# Patient Record
Sex: FEMALE | URBAN - METROPOLITAN AREA
[De-identification: names, ages, dates, MRNs, and addresses within clinical notes are randomized per-mention and may not be internally consistent; named-entity substitution may affect disease eponyms.]

---

## 2018-10-18 ENCOUNTER — IMPORTED ENCOUNTER (OUTPATIENT)
Dept: URBAN - METROPOLITAN AREA CLINIC 1 | Facility: CLINIC | Age: 28
End: 2018-10-18

## 2018-10-18 PROBLEM — H52.13: Noted: 2018-10-18

## 2018-10-18 PROBLEM — H52.4: Noted: 2018-10-18

## 2018-10-18 PROCEDURE — S0620 ROUTINE OPHTHALMOLOGICAL EXA: HCPCS

## 2018-10-18 NOTE — PATIENT DISCUSSION
1. Myopia OU -- Finalized Glasses MRx was given to patient today for correction if indicated and requested2. Presbyopia OU 3. Dry Eyes w/ PEK OU (Plugs in Place LL's OU) -- Recommend the frequent use of OTC AT's BID-QID OU (sample given)4. S/p LASIK OU (Dr. Elizabeth Cuello - 4 yrs ago) -- Doing well Trial CTL given to patient today (Check BCVA w/ CTL: 20/15 & J1)Return for an appointment in 1 WK for a CC OU with Dr. Shanika Hansen.

## 2018-10-29 ENCOUNTER — IMPORTED ENCOUNTER (OUTPATIENT)
Dept: URBAN - METROPOLITAN AREA CLINIC 1 | Facility: CLINIC | Age: 28
End: 2018-10-29

## 2018-10-29 PROBLEM — H52.13: Noted: 2018-10-29

## 2018-10-29 NOTE — PATIENT DISCUSSION
1.  CC today- Final rx for CL given. Pt also has been experiencing JUVENTINO OU w/ CL Wear. Advised use ATs TID OU Routinely (can use more frequently PRN) Begin Omega 3 Fatty Acids. 2.  Myopia OU -- Finalized Glasses MRx was given to patient today for correction if indicated and requested3. Presbyopia OU 4. Dry Eyes w/ PEK OU (Plugs in Place LL's OU) -- Recommend the frequent use of OTC AT's BID-QID OU (sample given)5. S/p LASIK OU (Dr. Brain Esquivel - 4 yrs ago) -- Doing well F/u in 1 year for 40/cc. Return for an appointment in 6 mo 30 Tear Lab with Dr. Natasha Baltazar.

## 2019-11-15 ENCOUNTER — IMPORTED ENCOUNTER (OUTPATIENT)
Dept: URBAN - METROPOLITAN AREA CLINIC 1 | Facility: CLINIC | Age: 29
End: 2019-11-15

## 2019-11-15 PROBLEM — H52.4: Noted: 2019-11-15

## 2019-11-15 PROBLEM — H52.13: Noted: 2019-11-15

## 2019-11-15 PROCEDURE — S0621 ROUTINE OPHTHALMOLOGICAL EXA: HCPCS

## 2019-11-15 NOTE — PATIENT DISCUSSION
1. Myopia / Presbyopia OU -- Finalized Glasses MRx was given to patient today for correction if indicated and requested. 2. Dry Eyes w/ PEK OU (Plugs in Place RLL Puncta Open LLL) -- Recommend the frequent use of OTC AT's BID-QID OU Routinely (Sample of Blink Given). 3. S/p LASIK OU (Dr. Angle Pappas - 4 yrs ago OU) -- Doing well. Trial CTL were given to patient today. Return for an appointment in 1 WK for a CC OU with Dr. Jennifer Andrews.

## 2019-12-09 ENCOUNTER — IMPORTED ENCOUNTER (OUTPATIENT)
Dept: URBAN - METROPOLITAN AREA CLINIC 1 | Facility: CLINIC | Age: 29
End: 2019-12-09

## 2019-12-09 NOTE — PATIENT DISCUSSION
1.  CC today - Good fit good comfort. Va doing well finalized CTL RXReturn for an appointment in 1 year 40/cc with Dr. Lauren Mandel.

## 2021-05-18 ENCOUNTER — IMPORTED ENCOUNTER (OUTPATIENT)
Dept: URBAN - METROPOLITAN AREA CLINIC 1 | Facility: CLINIC | Age: 31
End: 2021-05-18

## 2021-05-18 PROBLEM — H52.13: Noted: 2021-05-18

## 2021-05-18 PROBLEM — H52.4: Noted: 2021-05-18

## 2021-05-18 PROCEDURE — S0621 ROUTINE OPHTHALMOLOGICAL EXA: HCPCS

## 2021-05-18 NOTE — PATIENT DISCUSSION
1. Myopia -- Rx was given for correction if indicated and requested. 2. Presbyopia 3. JUVENTINO w/ PEK OU (LL Plug in place OD/Puncta Open OS) -- Cont ATs TID OU routinely (Sample of Refresh for Contacts given). 4.  S/p LASIK OU (2015; Dr. Stubbs Foot CTL Rx and given to patient (DT1). Return for an appointment in 1 year 40/cc with Dr. Debra Stallings.

## 2022-04-02 ASSESSMENT — VISUAL ACUITY
OS_CC: J1+
OS_CC: J1+
OD_CC: J1+
OD_SC: 20/20
OS_SC: 20/20
OD_CC: J1+
OS_SC: 20/15
OD_SC: 20/20
OU_SC: 20/15
OD_SC: 20/20
OD_CC: J1+
OS_SC: 20/20
OD_SC: 20/20
OD_CC: J1+
OS_CC: J1+
OD_CC: J1+
OS_SC: 20/20
OD_SC: 20/20
OS_CC: J1+
OS_SC: 20/20
OS_CC: J1+
OU_CC: J1+

## 2022-04-02 ASSESSMENT — KERATOMETRY
OD_AXISANGLE_DEGREES: 155
OS_K2POWER_DIOPTERS: 43.00
OD_K1POWER_DIOPTERS: 42.50
OS_AXISANGLE_DEGREES: 150
OS_AXISANGLE2_DEGREES: 060
OS_K1POWER_DIOPTERS: 43.00
OD_AXISANGLE2_DEGREES: 065
OD_K2POWER_DIOPTERS: 42.75

## 2022-04-02 ASSESSMENT — TONOMETRY
OD_IOP_MMHG: 14
OS_IOP_MMHG: 13
OD_IOP_MMHG: 13
OS_IOP_MMHG: 13

## 2022-08-16 ENCOUNTER — EMERGENCY VISIT (OUTPATIENT)
Dept: URBAN - METROPOLITAN AREA CLINIC 1 | Facility: CLINIC | Age: 32
End: 2022-08-16

## 2022-08-16 DIAGNOSIS — H43.393: ICD-10-CM

## 2022-08-16 PROCEDURE — 92012 INTRM OPH EXAM EST PATIENT: CPT

## 2022-08-16 ASSESSMENT — TONOMETRY
OS_IOP_MMHG: 14
OD_IOP_MMHG: 14

## 2022-09-06 ENCOUNTER — EMERGENCY VISIT (OUTPATIENT)
Dept: URBAN - METROPOLITAN AREA CLINIC 1 | Facility: CLINIC | Age: 32
End: 2022-09-06

## 2022-09-06 DIAGNOSIS — H15.102: ICD-10-CM

## 2022-09-06 DIAGNOSIS — H10.13: ICD-10-CM

## 2022-09-06 PROCEDURE — 92012 INTRM OPH EXAM EST PATIENT: CPT

## 2022-09-06 RX ORDER — PREDNISOLONE ACETATE 10 MG/ML: 1 SUSPENSION/ DROPS OPHTHALMIC

## 2022-09-06 ASSESSMENT — VISUAL ACUITY
OS_SC: J1
OD_SC: 20/25-2
OD_SC: J1
OS_SC: 20/30-2

## 2022-09-06 ASSESSMENT — TONOMETRY
OS_IOP_MMHG: 14
OD_IOP_MMHG: 14

## 2022-09-06 NOTE — PATIENT DISCUSSION
Condition discussed with patient. Avoidance of allergens recommended. Will have the patient start on Lastacaft BID OU.

## 2022-09-06 NOTE — PATIENT DISCUSSION
Diagnosis discussed in detail with the patient.  Will have the patient begin the use of Prednisolone 0.1% TID OS. F/u 1 week IOP check.

## 2022-09-06 NOTE — PATIENT DISCUSSION
Diagnosis discussed with the patient in detail. Patient is 8 weeks post partum and did experience pre-eclampsia during pregnancy, but states that her blood pressure is under control.

## 2022-09-13 ENCOUNTER — FOLLOW UP (OUTPATIENT)
Dept: URBAN - METROPOLITAN AREA CLINIC 1 | Facility: CLINIC | Age: 32
End: 2022-09-13

## 2022-09-13 DIAGNOSIS — H15.102: ICD-10-CM

## 2022-09-13 DIAGNOSIS — H10.13: ICD-10-CM

## 2022-09-13 PROCEDURE — 92012 INTRM OPH EXAM EST PATIENT: CPT

## 2022-09-13 ASSESSMENT — TONOMETRY
OS_IOP_MMHG: 14
OD_IOP_MMHG: 14

## 2022-09-13 NOTE — PATIENT DISCUSSION
Condition discussed with patient. Avoidance of allergens recommended. Okay to use Lastacaft or Pataday PRN OU.

## 2022-09-13 NOTE — PATIENT DISCUSSION
The patient reports that she has noticed some improvement since starting the Prednisolone TID OS. She notes that she is still experiencing some itching but that it is not as bad. Improvement found on today's slit lamp exam. Will have the patient begin tapering pred. Advised the continuation of Pred TID OS through the end of this week, then BID OS for one week, then QD OS for one week and D/C after. F/u as scheduled 10/2022 4-0cc.

## 2022-11-17 ENCOUNTER — COMPREHENSIVE EXAM (OUTPATIENT)
Dept: URBAN - METROPOLITAN AREA CLINIC 1 | Facility: CLINIC | Age: 32
End: 2022-11-17

## 2022-11-17 DIAGNOSIS — H52.222: ICD-10-CM

## 2022-11-17 DIAGNOSIS — H52.4: ICD-10-CM

## 2022-11-17 DIAGNOSIS — H52.13: ICD-10-CM

## 2022-11-17 PROCEDURE — 92015 DETERMINE REFRACTIVE STATE: CPT

## 2022-11-17 PROCEDURE — 92014 COMPRE OPH EXAM EST PT 1/>: CPT

## 2022-11-17 ASSESSMENT — VISUAL ACUITY
OS_CC: 20/20
OS_CC: J1+
OD_CC: J1+
OD_CC: 20/20

## 2022-11-17 ASSESSMENT — KERATOMETRY
OS_K1POWER_DIOPTERS: 42.75
OD_K1POWER_DIOPTERS: 42.00
OD_AXISANGLE2_DEGREES: 92
OS_AXISANGLE2_DEGREES: 93
OD_AXISANGLE_DEGREES: 002
OD_K2POWER_DIOPTERS: 43.00
OS_AXISANGLE_DEGREES: 003
OS_K2POWER_DIOPTERS: 43.25

## 2022-11-17 ASSESSMENT — TONOMETRY
OS_IOP_MMHG: 15
OD_IOP_MMHG: 15

## 2023-08-28 ENCOUNTER — EMERGENCY VISIT (OUTPATIENT)
Dept: URBAN - METROPOLITAN AREA CLINIC 1 | Facility: CLINIC | Age: 33
End: 2023-08-28

## 2023-08-28 DIAGNOSIS — H11.32: ICD-10-CM

## 2023-08-28 PROCEDURE — 92012 INTRM OPH EXAM EST PATIENT: CPT

## 2023-08-28 ASSESSMENT — VISUAL ACUITY
OS_CC: 20/20
OD_CC: 20/20
OU_CC: J1+

## 2023-08-28 ASSESSMENT — KERATOMETRY
OS_AXISANGLE2_DEGREES: 93
OD_AXISANGLE_DEGREES: 002
OD_K2POWER_DIOPTERS: 43.00
OS_AXISANGLE_DEGREES: 003
OD_K1POWER_DIOPTERS: 42.00
OD_AXISANGLE2_DEGREES: 92
OS_K1POWER_DIOPTERS: 42.75
OS_K2POWER_DIOPTERS: 43.25

## 2023-11-20 ENCOUNTER — COMPREHENSIVE EXAM (OUTPATIENT)
Dept: URBAN - METROPOLITAN AREA CLINIC 1 | Facility: CLINIC | Age: 33
End: 2023-11-20

## 2023-11-20 DIAGNOSIS — Z46.0: ICD-10-CM

## 2023-11-20 DIAGNOSIS — H52.13: ICD-10-CM

## 2023-11-20 DIAGNOSIS — H52.222: ICD-10-CM

## 2023-11-20 DIAGNOSIS — Z01.00: ICD-10-CM

## 2023-11-20 DIAGNOSIS — H52.4: ICD-10-CM

## 2023-11-20 PROCEDURE — 92014 COMPRE OPH EXAM EST PT 1/>: CPT

## 2023-11-20 PROCEDURE — 92310-2 LEVEL 2 CONTACT LENS MANAGEMENT

## 2023-11-20 PROCEDURE — 92015 DETERMINE REFRACTIVE STATE: CPT

## 2023-11-20 ASSESSMENT — VISUAL ACUITY
OS_CC: 20/20-2
OD_CC: J1+
OS_CC: J1+
OD_CC: 20/20

## 2023-11-20 ASSESSMENT — TONOMETRY
OS_IOP_MMHG: 14
OD_IOP_MMHG: 15

## 2023-11-20 ASSESSMENT — KERATOMETRY
OS_K1POWER_DIOPTERS: 42.75
OS_AXISANGLE_DEGREES: 003
OD_AXISANGLE2_DEGREES: 92
OS_AXISANGLE2_DEGREES: 93
OD_K1POWER_DIOPTERS: 42.00
OD_K2POWER_DIOPTERS: 43.00
OD_AXISANGLE_DEGREES: 002
OS_K2POWER_DIOPTERS: 43.25